# Patient Record
Sex: MALE | Race: WHITE | NOT HISPANIC OR LATINO | Employment: PART TIME | URBAN - METROPOLITAN AREA
[De-identification: names, ages, dates, MRNs, and addresses within clinical notes are randomized per-mention and may not be internally consistent; named-entity substitution may affect disease eponyms.]

---

## 2017-08-02 ENCOUNTER — HOSPITAL ENCOUNTER (EMERGENCY)
Facility: CLINIC | Age: 22
Discharge: HOME | End: 2017-08-02
Attending: EMERGENCY MEDICINE | Admitting: EMERGENCY MEDICINE
Payer: COMMERCIAL

## 2017-08-02 VITALS
TEMPERATURE: 98 F | DIASTOLIC BLOOD PRESSURE: 80 MMHG | HEIGHT: 73 IN | HEART RATE: 61 BPM | WEIGHT: 188 LBS | OXYGEN SATURATION: 98 % | BODY MASS INDEX: 24.92 KG/M2 | SYSTOLIC BLOOD PRESSURE: 140 MMHG | RESPIRATION RATE: 18 BRPM

## 2017-08-02 DIAGNOSIS — S81.811A LACERATION OF LEG, RIGHT, INITIAL ENCOUNTER: ICD-10-CM

## 2017-08-02 PROCEDURE — 99283 EMERGENCY DEPT VISIT LOW MDM: CPT

## 2017-08-02 PROCEDURE — 12002 RPR S/N/AX/GEN/TRNK2.6-7.5CM: CPT

## 2017-08-02 NOTE — ED AVS SNAPSHOT
Emergency Department    [address] [place] [address]    Phone:  [phone]    Fax:  [phone]                                       Kannan Ren   MRN: [id number]    Department:   Emergency Department   Date of Visit:  8/2/2017           After Visit Summary Signature Page     I have received my discharge instructions, and my questions have been answered. I have discussed any challenges I see with this plan with the nurse or doctor.    ..........................................................................................................................................  Patient/Patient Representative Signature      ..........................................................................................................................................  Patient Representative Print Name and Relationship to Patient    ..................................................               ................................................  Date                                            Time    ..........................................................................................................................................  Reviewed by Signature/Title    ...................................................              ..............................................  Date                                                            Time

## 2017-08-02 NOTE — ED AVS SNAPSHOT
Emergency Department    [address] [place] [address]    Phone:  [phone]    Fax:  [phone]                                       Kannan Ren   MRN: [id number]    Department:   Emergency Department   Date of Visit:  8/2/2017           Patient Information     Date Of Birth          1995        Your diagnoses for this visit were:     Laceration of leg, right, initial encounter        You were seen by Caro Reddy MD.      Follow-up Information     Follow up with None.    Why:  Sutures out in 10 days. Antibiotic ointment and bandage        Discharge Instructions          * LACERATION (All Closures)  A laceration is a cut through the skin. This will usually require stitches (sutures) or staples if it is deep. Minor cuts may be treated with a tape closure ( Steri-Strips ) or Dermabond skin glue.       HOME CARE:  PAIN MEDICINE: You may use acetaminophen (Tylenol) 650-1000 mg every 6 hours or ibuprofen (Motrin, Advil) 600 mg every 6-8 hours with food to control pain, if you are able to take these medicines. [NOTE: If you have chronic liver or kidney disease or ever had a stomach ulcer or GI bleeding, talk with your doctor before using these medicines.]  EXTREMITY, FACE or TRUNK WOUNDS:    Keep the wound clean and dry. If a bandage was applied and it becomes wet or dirty, replace it. Otherwise, leave it in place for the first 24 hours.    If stitches or staples were used, clean the wound daily. Protect the wound from sunlight and tanning lamps.    After removing the bandage, wash the area with soap and water. Use a wet cotton swab (Q tip) to loosen and remove any blood or crust that forms.    After cleaning, apply a thin layer of Polysporin or Bacitracin ointment. This will keep the wound clean and make it easier to remove the stitches or staples. Reapply a fresh bandage.    You may remove the bandage to shower as usual after the first 24 hours, but do not soak the area in  water (no swimming) until the stitches or staples are removed.    If Steri-Strips were used, keep the area clean and dry. If it becomes wet, blot it dry with a towel. It is okay to take a brief shower, but avoid scrubbing the area.    If Dermabond skin adhesive was used, do not scratch, rub or pick at the adhesive film. Do not place tape directly over the film. Do not apply liquid, ointment or creams to the wound while the film is in place. Do not clean the wound with peroxide and do not apply ointments. Avoid activities that cause heavy sweating until the film has fallen off. Protect the wound from prolonged exposure to sunlight or tanning lamps. You may shower as usual but do not soak the wound in water (no baths or swimming). The film will fall off by itself in 5-10 days.  SCALP WOUNDS: During the first two days, you may carefully rinse your hair in the shower to remove blood, glass or dirt particles. After two days, you may shower and shampoo your hair normally. Do not soak your scalp in the tub or go swimming until the stitches or staples have been removed.  MOUTH WOUNDS: Eat soft foods to reduce pain. If the cut is inside of your mouth, clean by rinsing after each meal and at bedtime with a mixture of equal parts water and Hydrogen Peroxide (do not swallow!). Or, you can use a cotton swab to directly apply Hydrogen Peroxide onto the cut.  After the wound is done healing, use sunscreen over the area whenever exposed for the next 6 minths to avoid a darker scar.     FOLLOW UP: Most skin wounds heal within ten days. Mouth and facial wounds heal within five days. However, even with proper treatment, a wound infection may sometimes occur. Therefore, you should check the wound daily for signs of infection listed below.  Stitches should be removed from the face within five days; stitches and staples should be removed from other parts of the body within 7-10 days. Unless you are told to come back to the emergency  room, you may have your doctor or urgent care remove the stitches. If dissolving stitches were used in the mouth, these will fall out or dissolve without the need for removal. If tape closures ( Steri-Strips ) were used, remove them yourself if they have not fallen off after 7 days. If Dermabond skin glue was used, the film will fall off by itself in 5-10 days.      GET PROMPT MEDICAL ATTENTION  if any of the following occur:    Increasing pain in the wound    Redness, swelling or pus coming from the wound    Fever over 101 F (38.3 C) oral    If stitches or staples come apart or fall out or if Steri-Strips fall off before seven days    If the wound edges re-open    Bleeding not controlled by direct pressure    8176-9500 [facility], [address] [place] [address]. All rights reserved. This information is not intended as a substitute for professional medical care. Always follow your healthcare professional's instructions.      24 Hour Appointment Hotline       To make an appointment at any [facility], call [phone] ([phone]). If you don't have a family doctor or clinic, we will help you find one. [facility] are conveniently located to serve the needs of you and your family.             Review of your medicines      Notice     You have not been prescribed any medications.            Orders Needing Specimen Collection     None      Pending Results     No orders found from 7/31/2017 to 8/3/2017.            Pending Culture Results     No orders found from 7/31/2017 to 8/3/2017.            Pending Results Instructions     If you had any lab results that were not finalized at the time of your Discharge, you can call the ED Lab Result RN at [phone]. You will be contacted by this team for any positive Lab results or changes in treatment. The nurses are available 7 days a week from 10A to 6:30P.  You can leave a message 24 hours per day and they will return your call.        Test  Results From Your Hospital Stay               Clinical Quality Measure: Blood Pressure Screening     Your blood pressure was checked while you were in the emergency department today. The last reading we obtained was  BP: 132/63 . Please read the guidelines below about what these numbers mean and what you should do about them.  If your systolic blood pressure (the top number) is less than 120 and your diastolic blood pressure (the bottom number) is less than 80, then your blood pressure is normal. There is nothing more that you need to do about it.  If your systolic blood pressure (the top number) is 120-139 or your diastolic blood pressure (the bottom number) is 80-89, your blood pressure may be higher than it should be. You should have your blood pressure rechecked within a year by a primary care provider.  If your systolic blood pressure (the top number) is 140 or greater or your diastolic blood pressure (the bottom number) is 90 or greater, you may have high blood pressure. High blood pressure is treatable, but if left untreated over time it can put you at risk for heart attack, stroke, or kidney failure. You should have your blood pressure rechecked by a primary care provider within the next 4 weeks.  If your provider in the emergency department today gave you specific instructions to follow-up with your doctor or provider even sooner than that, you should follow that instruction and not wait for up to 4 weeks for your follow-up visit.        Thank you for choosing [facility]       Thank you for choosing [facility] for your care. Our goal is always to provide you with excellent care. Hearing back from our patients is one way we can continue to improve our services. Please take a few minutes to complete the written survey that you may receive in the mail after you visit with us. Thank you!        [facility] Information     [facility] lets you send messages to your doctor, view your test results, renew your prescriptions,  "schedule appointments and more. To sign up, go to [email or web address] . Click on \"Log in\" on the left side of the screen, which will take you to the Welcome page. Then click on \"Sign up Now\" on the right side of the page.     You will be asked to enter the access code listed below, as well as some personal information. Please follow the directions to create your username and password.     Your access code is: [email or web address]  Expires: 10/31/2017 11:47 PM     Your access code will  in 90 days. If you need help or a new code, please call your [facility] clinic or [phone].        Care EveryWhere ID     This is your Care EveryWhere ID. This could be used by other organizations to access your [facility] medical records  [id number]        Equal Access to Services     BENY [place] : Scott Chen, karen rubio, alma rosa kim, mervat maki . So [facility] [phone].    ATENCIÓN: Si habla español, tiene a tafoya disposición servicios gratuitos de asistencia lingüística. Llame al [phone].    We comply with applicable federal civil rights laws and [place] laws. We do not discriminate on the basis of race, color, national origin, age, disability sex, sexual orientation or gender identity.            After Visit Summary       This is your record. Keep this with you and show to your community pharmacist(s) and doctor(s) at your next visit.                  "

## 2017-08-03 ASSESSMENT — ENCOUNTER SYMPTOMS: WOUND: 1

## 2017-08-03 NOTE — ED PROVIDER NOTES
"  History     Chief Complaint:  Laceration     HPI   Kannan Ren is a 22 year old otherwise healthy male who presents to the emergency department today for evaluation of a laceration. The patient reports around 2145, an hour prior to arrival, he accidentally fell backwards onto a glass coffee table sustaining a laceration to his posterior right calf. The glass was broken before he fell and he denies any foreign glass in the wound. He placed tape onto the wound immediately afterwards and bleeding is controlled. He denies any other injuries. Of note, tetanus is up to date.     Allergies:  Amoxicillin      Medications:    The patient is currently on no regular medications.    Past Medical History:    History reviewed. No pertinent past surgical history.    Past Surgical History:    Orthopedic surgery    Family History:    History reviewed. No pertinent family history.     Social History:  The patient was accompanied to the ED by his significant other..  Smoking Status: Never  Smokeless Tobacco: Never  Alcohol Use: No  Marital Status:  Single      Review of Systems   Skin: Positive for wound (laceration right upper calf).     Physical Exam   First Vitals:  BP: 132/63  Pulse: 61  Temp: 98  F (36.7  C)  Resp: 18  Height: 185.4 cm (6' 1\")  Weight: 85.3 kg (188 lb)  SpO2: 98 %      Physical Exam  Physical Exam   Constitutional:  Patient is oriented to person, place, and time. They appear well-developed and well-nourished. No obvious distress.    HENT:   Mouth/Throat:   Oropharynx is clear and moist.   Eyes:    Conjunctivae normal and EOM are normal. Pupils are equal, round, and reactive to light.   Neck:    Normal range of motion.   Cardiovascular: Normal rate, regular rhythm and normal heart sounds.  Exam reveals no gallop and no friction rub.  No murmur heard.  Pulmonary/Chest:  Effort normal and breath sounds normal. Patient has no wheezes. Patient has no rales.   Musculoskeletal:  Normal range of motion. Patient " exhibits no edema.   Neurological:   Patient is alert and oriented to person, place, and time. Patient has normal strength. No cranial nerve deficit or sensory deficit. GCS 15  Skin:    3cm posterior proximal right calf laceration, superficial to subcutaneous fat. No muscle involvement. No glass noted.  Psychiatric:   Patient has a normal mood and affect. Patient's behavior is normal. Judgment and thought content normal.     Emergency Department Course     Procedures:     Laceration Repair      LACERATION:  A simple clean 3 cm laceration.    LOCATION:  Proximal right calf.    FUNCTION:  Distally sensation, circulation, motor and tendon function are intact.    ANESTHESIA:  Local using 1% Xylocaine total of 3 mLs.    PREPARATION:  Irrigation with Normal Saline and Shur Clens.    DEBRIDEMENT:  no debridement.    CLOSURE:  Wound was closed with One Layer.  Skin closed with 6 x 4.0 Ethylon using interrupted sutures..    Emergency Department Course:  Nursing notes and vitals reviewed.  2300: I performed an exam of the patient as documented above.   2345: Above laceration performed.   I discussed the treatment plan with the patient. They expressed understanding of this plan and consented to discharge. They will be discharged home with instructions for care and follow up. In addition, the patient will return to the emergency department if their symptoms persist, worsen, if new symptoms arise or if there is any concern.  All questions were answered.    Impression & Plan      Medical Decision Making:  The patient presented with a laceration.  The wound was carefully evaluated and explored.  No foreign bodies were noted on exploration or irrigation.  The laceration was closed with sutures as noted above.  There is no evidence of muscular, tendon, or bony damage with this laceration.  There is also no evidence of vascular or neurologic compromise.  Possible complications (infection, scarring) were reviewed with the patient.   Follow up with primary care will be indicated for suture removal as noted in the discharge section.    Diagnosis:    ICD-10-CM    1. Laceration of leg, right, initial encounter S81.811A      Disposition:  Discharged to home    Scribe Disclosure:  I, Teresa Marquez, am serving as a scribe at 10:40 PM on 8/2/2017 to document services personally performed by Caro Reddy MD based on my observations and the provider's statements to me.     8/2/2017    EMERGENCY DEPARTMENT       Caro Reddy MD  08/03/17 0011

## 2017-08-03 NOTE — DISCHARGE INSTRUCTIONS
* LACERATION (All Closures)  A laceration is a cut through the skin. This will usually require stitches (sutures) or staples if it is deep. Minor cuts may be treated with a tape closure ( Steri-Strips ) or Dermabond skin glue.       HOME CARE:  PAIN MEDICINE: You may use acetaminophen (Tylenol) 650-1000 mg every 6 hours or ibuprofen (Motrin, Advil) 600 mg every 6-8 hours with food to control pain, if you are able to take these medicines. [NOTE: If you have chronic liver or kidney disease or ever had a stomach ulcer or GI bleeding, talk with your doctor before using these medicines.]  EXTREMITY, FACE or TRUNK WOUNDS:    Keep the wound clean and dry. If a bandage was applied and it becomes wet or dirty, replace it. Otherwise, leave it in place for the first 24 hours.    If stitches or staples were used, clean the wound daily. Protect the wound from sunlight and tanning lamps.    After removing the bandage, wash the area with soap and water. Use a wet cotton swab (Q tip) to loosen and remove any blood or crust that forms.    After cleaning, apply a thin layer of Polysporin or Bacitracin ointment. This will keep the wound clean and make it easier to remove the stitches or staples. Reapply a fresh bandage.    You may remove the bandage to shower as usual after the first 24 hours, but do not soak the area in water (no swimming) until the stitches or staples are removed.    If Steri-Strips were used, keep the area clean and dry. If it becomes wet, blot it dry with a towel. It is okay to take a brief shower, but avoid scrubbing the area.    If Dermabond skin adhesive was used, do not scratch, rub or pick at the adhesive film. Do not place tape directly over the film. Do not apply liquid, ointment or creams to the wound while the film is in place. Do not clean the wound with peroxide and do not apply ointments. Avoid activities that cause heavy sweating until the film has fallen off. Protect the wound from prolonged  exposure to sunlight or tanning lamps. You may shower as usual but do not soak the wound in water (no baths or swimming). The film will fall off by itself in 5-10 days.  SCALP WOUNDS: During the first two days, you may carefully rinse your hair in the shower to remove blood, glass or dirt particles. After two days, you may shower and shampoo your hair normally. Do not soak your scalp in the tub or go swimming until the stitches or staples have been removed.  MOUTH WOUNDS: Eat soft foods to reduce pain. If the cut is inside of your mouth, clean by rinsing after each meal and at bedtime with a mixture of equal parts water and Hydrogen Peroxide (do not swallow!). Or, you can use a cotton swab to directly apply Hydrogen Peroxide onto the cut.  After the wound is done healing, use sunscreen over the area whenever exposed for the next 6 minths to avoid a darker scar.     FOLLOW UP: Most skin wounds heal within ten days. Mouth and facial wounds heal within five days. However, even with proper treatment, a wound infection may sometimes occur. Therefore, you should check the wound daily for signs of infection listed below.  Stitches should be removed from the face within five days; stitches and staples should be removed from other parts of the body within 7-10 days. Unless you are told to come back to the emergency room, you may have your doctor or urgent care remove the stitches. If dissolving stitches were used in the mouth, these will fall out or dissolve without the need for removal. If tape closures ( Steri-Strips ) were used, remove them yourself if they have not fallen off after 7 days. If Dermabond skin glue was used, the film will fall off by itself in 5-10 days.      GET PROMPT MEDICAL ATTENTION  if any of the following occur:    Increasing pain in the wound    Redness, swelling or pus coming from the wound    Fever over 101 F (38.3 C) oral    If stitches or staples come apart or fall out or if Steri-Strips fall  off before seven days    If the wound edges re-open    Bleeding not controlled by direct pressure    3231-2355 [facility], [address] [place] [address], [place] [address]. All rights reserved. This information is not intended as a substitute for professional medical care. Always follow your healthcare professional's instructions.

## 2018-03-26 ENCOUNTER — OFFICE VISIT (OUTPATIENT)
Dept: URGENT CARE | Facility: URGENT CARE | Age: 23
End: 2018-03-26

## 2018-03-26 ENCOUNTER — NURSE TRIAGE (OUTPATIENT)
Dept: NURSING | Facility: CLINIC | Age: 23
End: 2018-03-26

## 2018-03-26 VITALS
OXYGEN SATURATION: 99 % | HEART RATE: 121 BPM | DIASTOLIC BLOOD PRESSURE: 73 MMHG | SYSTOLIC BLOOD PRESSURE: 125 MMHG | TEMPERATURE: 103.4 F | WEIGHT: 180 LBS | BODY MASS INDEX: 23.75 KG/M2

## 2018-03-26 DIAGNOSIS — R07.0 THROAT PAIN: ICD-10-CM

## 2018-03-26 DIAGNOSIS — J10.1 INFLUENZA B: Primary | ICD-10-CM

## 2018-03-26 DIAGNOSIS — R68.89 FLU-LIKE SYMPTOMS: ICD-10-CM

## 2018-03-26 LAB
DEPRECATED S PYO AG THROAT QL EIA: NORMAL
FLUAV+FLUBV AG SPEC QL: NEGATIVE
FLUAV+FLUBV AG SPEC QL: POSITIVE
SPECIMEN SOURCE: ABNORMAL
SPECIMEN SOURCE: NORMAL

## 2018-03-26 PROCEDURE — 87880 STREP A ASSAY W/OPTIC: CPT | Performed by: FAMILY MEDICINE

## 2018-03-26 PROCEDURE — 87804 INFLUENZA ASSAY W/OPTIC: CPT | Performed by: FAMILY MEDICINE

## 2018-03-26 PROCEDURE — 99203 OFFICE O/P NEW LOW 30 MIN: CPT | Performed by: FAMILY MEDICINE

## 2018-03-26 PROCEDURE — 87081 CULTURE SCREEN ONLY: CPT | Performed by: FAMILY MEDICINE

## 2018-03-26 RX ORDER — OSELTAMIVIR PHOSPHATE 75 MG/1
75 CAPSULE ORAL 2 TIMES DAILY
Qty: 10 CAPSULE | Refills: 0 | Status: SHIPPED | OUTPATIENT
Start: 2018-03-26

## 2018-03-26 ASSESSMENT — ENCOUNTER SYMPTOMS
TROUBLE SWALLOWING: 1
FATIGUE: 1
FEVER: 1
NAUSEA: 1
CHILLS: 1
COUGH: 1
DIAPHORESIS: 1
SORE THROAT: 1

## 2018-03-26 NOTE — MR AVS SNAPSHOT
After Visit Summary   3/26/2018    Kannan Ren    MRN: [id number]           Patient Information     Date Of Birth          1995        Visit Information        Provider Department      3/26/2018 8:15 PM Jasper Alvarenga MD [facility]        Today's Diagnoses     Influenza B    -  1    Throat pain          Care Instructions      Influenza (Adult)    Influenza is also called the flu. It is a viral illness that affects the air passages of your lungs. It is different from the common cold. The flu can easily be passed from one to person to another. It may be spread through the air by coughing and sneezing. Or it can be spread by touching the sick person and then touching your own eyes, nose, or mouth.  The flu starts 1 to 3 days after you are exposed to the flu virus. It may last for 1 to 2 weeks but many people feel tired or fatigued for many weeks afterward. You usually don t need to take antibiotics unless you have a complication. This might be an ear or sinus infection or pneumonia.  Symptoms of the flu may be mild or severe. They can include extreme tiredness (wanting to stay in bed all day), chills, fevers, muscle aches, soreness with eye movement, headache, and a dry, hacking cough.  Home care  Follow these guidelines when caring for yourself at home:    Avoid being around cigarette smoke, whether yours or other people s.    Acetaminophen or ibuprofen will help ease your fever, muscle aches, and headache. Don t give aspirin to anyone younger than 18 who has the flu. Aspirin can harm the liver.    Nausea and loss of appetite are common with the flu. Eat light meals. Drink 6 to 8 glasses of liquids every day. Good choices are water, sport drinks, soft drinks without caffeine, juices, tea, and soup. Extra fluids will also help loosen secretions in your nose and lungs.    Over-the-counter cold medicines will not make the flu go away faster. But the medicines may  help with coughing, sore throat, and congestion in your nose and sinuses. Don t use a decongestant if you have high blood pressure.    Stay home until your fever has been gone for at least 24 hours without using medicine to reduce fever.  Follow-up care  Follow up with your healthcare provider, or as advised, if you are not getting better over the next week.  If you are age 65 or older, talk with your provider about getting a pneumococcal vaccine every 5 years. You should also get this vaccine if you have chronic asthma or COPD. All adults should get a flu vaccine every fall. Ask your provider about this.  When to seek medical advice  Call your healthcare provider right away if any of these occur:    Cough with lots of colored mucus (sputum) or blood in your mucus    Chest pain, shortness of breath, wheezing, or trouble breathing    Severe headache, or face, neck, or ear pain    New rash with fever    Fever of 100.4 F (38 C) or higher, or as directed by your healthcare provider    Confusion, behavior change, or seizure    Severe weakness or dizziness    You get a new fever or cough after getting better for a few days  Date Last Reviewed: 1/1/2017 2000-2017 [employer]. [address] [place] [address]. All rights reserved. This information is not intended as a substitute for professional medical care. Always follow your healthcare professional's instructions.                Follow-ups after your visit        Follow-up notes from your care team     Return if symptoms worsen or fail to improve.      Who to contact     If you have questions or need follow up information about today's clinic visit or your schedule please contact [facility] directly at [phone].  Normal or non-critical lab and imaging results will be communicated to you by MyChart, letter or phone within 4 business days after the clinic has received the results. If you do not hear from us within 7  "days, please contact the clinic through revoPT or phone. If you have a critical or abnormal lab result, we will notify you by phone as soon as possible.  Submit refill requests through revoPT or call your pharmacy and they will forward the refill request to us. Please allow 3 business days for your refill to be completed.          Additional Information About Your Visit        InCommharTicket [employer] Information     revoPT lets you send messages to your doctor, view your test results, renew your prescriptions, schedule appointments and more. To sign up, go to [email or web address] . Click on \"Log in\" on the left side of the screen, which will take you to the Welcome page. Then click on \"Sign up Now\" on the right side of the page.     You will be asked to enter the access code listed below, as well as some personal information. Please follow the directions to create your username and password.     Your access code is: [email or web address]  Expires: 2018  8:58 PM     Your access code will  in 90 days. If you need help or a new code, please call your [facility] clinic or [phone].        Care EveryWhere ID     This is your Care EveryWhere ID. This could be used by other organizations to access your [facility] medical records  [id number]        Your Vitals Were     Pulse Temperature Pulse Oximetry BMI (Body Mass Index)          121 103.4  F (39.7  C) (Oral) 99% 23.75 kg/m2         Blood Pressure from Last 3 Encounters:   18 125/73   17 140/80   16 138/57    Weight from Last 3 Encounters:   18 180 lb (81.6 kg)   17 188 lb (85.3 kg)   16 194 lb (88 kg)              We Performed the Following     Strep, Rapid Screen          Today's Medication Changes          These changes are accurate as of 3/26/18  8:59 PM.  If you have any questions, ask your nurse or doctor.               Start taking these medicines.        Dose/Directions    oseltamivir 75 MG capsule   Commonly known as:  TAMIFLU   Used " for:  Influenza B   Started by:  Jasper Alvarenga MD        Dose:  75 mg   Take 1 capsule (75 mg) by mouth 2 times daily   Quantity:  10 capsule   Refills:  0            Where to get your medicines      [facility] to [facility]  [place], [place] - [address] AT [address] [place] [address] & [facility]  [address] [place] [address]    Hours:  24-hours Phone:  [phone]     oseltamivir 75 MG capsule                Primary Care Provider Fax #    Provider Not In System [phone]                Equal Access to Services     [facility]: Hadii aad ku hadasho Soomaali, waaxda luqadaha, qaybta kaalmada ademaxyaandrey, mervat maki . So [facility] [phone].    ATENCIÓN: Si habla español, tiene a tafoya disposición servicios gratuitos de asistencia lingüística. [facility] [phone].    We comply with applicable federal civil rights laws and [place] laws. We do not discriminate on the basis of race, color, national origin, age, disability, sex, sexual orientation, or gender identity.            Thank you!     Thank you for choosing [facility]  for your care. Our goal is always to provide you with excellent care. Hearing back from our patients is one way we can continue to improve our services. Please take a few minutes to complete the written survey that you may receive in the mail after your visit with us. Thank you!             Your Updated Medication List - Protect others around you: Learn how to safely use, store and throw away your medicines at www.disposemymeds.org.          This list is accurate as of 3/26/18  8:59 PM.  Always use your most recent med list.                   Brand Name Dispense Instructions for use Diagnosis    IBUPROFEN PO           oseltamivir 75 MG capsule    TAMIFLU    10 capsule    Take 1 capsule (75 mg) by mouth 2 times daily    Influenza B

## 2018-03-27 LAB
BACTERIA SPEC CULT: NORMAL
SPECIMEN SOURCE: NORMAL

## 2018-03-27 NOTE — PATIENT INSTRUCTIONS
Influenza (Adult)    Influenza is also called the flu. It is a viral illness that affects the air passages of your lungs. It is different from the common cold. The flu can easily be passed from one to person to another. It may be spread through the air by coughing and sneezing. Or it can be spread by touching the sick person and then touching your own eyes, nose, or mouth.  The flu starts 1 to 3 days after you are exposed to the flu virus. It may last for 1 to 2 weeks but many people feel tired or fatigued for many weeks afterward. You usually don t need to take antibiotics unless you have a complication. This might be an ear or sinus infection or pneumonia.  Symptoms of the flu may be mild or severe. They can include extreme tiredness (wanting to stay in bed all day), chills, fevers, muscle aches, soreness with eye movement, headache, and a dry, hacking cough.  Home care  Follow these guidelines when caring for yourself at home:    Avoid being around cigarette smoke, whether yours or other people s.    Acetaminophen or ibuprofen will help ease your fever, muscle aches, and headache. Don t give aspirin to anyone younger than 18 who has the flu. Aspirin can harm the liver.    Nausea and loss of appetite are common with the flu. Eat light meals. Drink 6 to 8 glasses of liquids every day. Good choices are water, sport drinks, soft drinks without caffeine, juices, tea, and soup. Extra fluids will also help loosen secretions in your nose and lungs.    Over-the-counter cold medicines will not make the flu go away faster. But the medicines may help with coughing, sore throat, and congestion in your nose and sinuses. Don t use a decongestant if you have high blood pressure.    Stay home until your fever has been gone for at least 24 hours without using medicine to reduce fever.  Follow-up care  Follow up with your healthcare provider, or as advised, if you are not getting better over the next week.  If you are age 65 or  older, talk with your provider about getting a pneumococcal vaccine every 5 years. You should also get this vaccine if you have chronic asthma or COPD. All adults should get a flu vaccine every fall. Ask your provider about this.  When to seek medical advice  Call your healthcare provider right away if any of these occur:    Cough with lots of colored mucus (sputum) or blood in your mucus    Chest pain, shortness of breath, wheezing, or trouble breathing    Severe headache, or face, neck, or ear pain    New rash with fever    Fever of 100.4 F (38 C) or higher, or as directed by your healthcare provider    Confusion, behavior change, or seizure    Severe weakness or dizziness    You get a new fever or cough after getting better for a few days  Date Last Reviewed: 1/1/2017 2000-2017 The [employer]. [address] [place] [address], [place] [address]. All rights reserved. This information is not intended as a substitute for professional medical care. Always follow your healthcare professional's instructions.

## 2018-03-27 NOTE — PROGRESS NOTES
SUBJECTIVE:   Kannan Ren is a 23 year old male presenting with a chief complaint of   Chief Complaint   Patient presents with     Urgent Care     Fever     fever,brown mucous,sore throat,headache,body aches        He is a new patient of [facility].    Onset of symptoms was 4 day(s) ago.  Course of illness is worsening.    Severity moderate  Current and Associated symptoms: fever, chills, sweats, cough - non-productive, sore throat, headache, body aches and dizzy  Treatment measures tried include Tylenol/Ibuprofen.  Predisposing factors include None.        Review of Systems   Constitutional: Positive for chills, diaphoresis, fatigue and fever.   HENT: Positive for sore throat and trouble swallowing. Negative for congestion and ear pain.    Respiratory: Positive for cough.    Gastrointestinal: Positive for nausea.       No past medical history on file.  No family history on file.  Current Outpatient Prescriptions   Medication Sig Dispense Refill     IBUPROFEN PO        oseltamivir (TAMIFLU) 75 MG capsule Take 1 capsule (75 mg) by mouth 2 times daily 10 capsule 0     Social History   Substance Use Topics     Smoking status: Never Smoker     Smokeless tobacco: Never Used     Alcohol use No       OBJECTIVE  /73  Pulse 121  Temp 103.4  F (39.7  C) (Oral)  Wt 180 lb (81.6 kg)  SpO2 99%  BMI 23.75 kg/m2    Physical Exam   Constitutional: He appears well-developed and well-nourished. No distress.   HENT:   Head: Normocephalic and atraumatic.   Right Ear: Tympanic membrane and external ear normal.   Left Ear: Tympanic membrane and external ear normal.   Mouth/Throat: Oropharyngeal exudate present.   Eyes: EOM are normal. Pupils are equal, round, and reactive to light.   Neck: Normal range of motion. Neck supple.   Pulmonary/Chest: Effort normal and breath sounds normal. No respiratory distress.   Lymphadenopathy:     He has cervical adenopathy.   Neurological: He is alert. No cranial nerve deficit.   Skin:  Skin is warm and dry. He is not diaphoretic.   Psychiatric: He has a normal mood and affect.   Nursing note and vitals reviewed.      Labs:  No results found for this or any previous visit (from the past 24 hour(s)).    X-Ray was not done.    ASSESSMENT:      ICD-10-CM    1. Influenza B J10.1 oseltamivir (TAMIFLU) 75 MG capsule   2. Throat pain R07.0 Strep, Rapid Screen        Medical Decision Making:    Differential Diagnosis:  URI Adult/Peds:  Influenza, Strep pharyngitis, Tonsilitis, Viral pharyngitis, Viral syndrome and Viral upper respiratory illness    Serious Comorbid Conditions:  Adult:  None    PLAN:    URI Adult:  Tylenol, Ibuprofen, Fluids, Rest and RX influenza  Tamiflu 75 mg bid x 5 days    Followup:    If not improving or if condition worsens, follow up with your Primary Care Provider    Patient Instructions     Influenza (Adult)    Influenza is also called the flu. It is a viral illness that affects the air passages of your lungs. It is different from the common cold. The flu can easily be passed from one to person to another. It may be spread through the air by coughing and sneezing. Or it can be spread by touching the sick person and then touching your own eyes, nose, or mouth.  The flu starts 1 to 3 days after you are exposed to the flu virus. It may last for 1 to 2 weeks but many people feel tired or fatigued for many weeks afterward. You usually don t need to take antibiotics unless you have a complication. This might be an ear or sinus infection or pneumonia.  Symptoms of the flu may be mild or severe. They can include extreme tiredness (wanting to stay in bed all day), chills, fevers, muscle aches, soreness with eye movement, headache, and a dry, hacking cough.  Home care  Follow these guidelines when caring for yourself at home:    Avoid being around cigarette smoke, whether yours or other people s.    Acetaminophen or ibuprofen will help ease your fever, muscle aches, and headache. Don t give  aspirin to anyone younger than 18 who has the flu. Aspirin can harm the liver.    Nausea and loss of appetite are common with the flu. Eat light meals. Drink 6 to 8 glasses of liquids every day. Good choices are water, sport drinks, soft drinks without caffeine, juices, tea, and soup. Extra fluids will also help loosen secretions in your nose and lungs.    Over-the-counter cold medicines will not make the flu go away faster. But the medicines may help with coughing, sore throat, and congestion in your nose and sinuses. Don t use a decongestant if you have high blood pressure.    Stay home until your fever has been gone for at least 24 hours without using medicine to reduce fever.  Follow-up care  Follow up with your healthcare provider, or as advised, if you are not getting better over the next week.  If you are age 65 or older, talk with your provider about getting a pneumococcal vaccine every 5 years. You should also get this vaccine if you have chronic asthma or COPD. All adults should get a flu vaccine every fall. Ask your provider about this.  When to seek medical advice  Call your healthcare provider right away if any of these occur:    Cough with lots of colored mucus (sputum) or blood in your mucus    Chest pain, shortness of breath, wheezing, or trouble breathing    Severe headache, or face, neck, or ear pain    New rash with fever    Fever of 100.4 F (38 C) or higher, or as directed by your healthcare provider    Confusion, behavior change, or seizure    Severe weakness or dizziness    You get a new fever or cough after getting better for a few days  Date Last Reviewed: 1/1/2017 2000-2017 [employer]. [address] [place] [address], [place] [address]. All rights reserved. This information is not intended as a substitute for professional medical care. Always follow your healthcare professional's instructions.

## 2018-03-27 NOTE — TELEPHONE ENCOUNTER
Sister is caller. She is with the patient and he is aware that she is placing the call. Reports patient has had a fever since 3-23-18. Temp is now 103.6 oral. He c/o sore throat pain, fatigue, nausea, headache and body aches. Denies dyspnea. Is able to walk to the bathroom without assistance. Caller declined being transferred to the scheduling line in order to schedule a clinic appointment.     Protocol and care advice reviewed.   Caller states understanding of the recommended disposition.  Advised to call back if further questions or concerns.     Lin Bean RN/FNA    Reason for Disposition    Fever present > 3 days (72 hours)    Additional Information    Negative: Severe difficulty breathing (e.g., struggling for each breath, speaks in single words)    Negative: Bluish lips, tongue, or face now    Negative: Shock suspected (e.g., cold/pale/clammy skin, too weak to stand, low BP, rapid pulse)    Negative: Sounds like a life-threatening emergency to the triager    Negative: Severe sore throat    Negative: [1] Doesn't match the criteria for Influenza AND [2] sounds like a cold    Negative: Influenza vaccine reaction is suspected    Negative: Chest pain  (Exception: MILD central chest pain, present only when coughing)    Negative: [1] Headache AND [2] stiff neck (can't touch chin to chest)    Negative: Fever > 104 F (40 C)    Negative: [1] Difficulty breathing AND [2] not severe AND [3] not from stuffy nose (e.g., not relieved by cleaning out the nose)    Negative: Patient sounds very sick or weak to the triager    Negative: [1] Fever > 101 F (38.3 C) AND [2] age > 60    Negative: [1] Fever > 101 F (38.3 C) AND [2] bedridden (e.g., nursing home patient, CVA, chronic illness, recovering from surgery)    Negative: [1] Fever > 100.5 F (38.1 C) AND [2] diabetes mellitus or weak immune system (e.g., HIV positive, cancer chemo, splenectomy, organ transplant, chronic steroids)    Negative: Patient is HIGH RISK (e.g., age  > 64 years, pregnant, HIV+, or chronic medical condition)    Protocols used: INFLUENZA - SEASONAL-ADULT-AH

## 2018-03-27 NOTE — NURSING NOTE
"Chief Complaint   Patient presents with     Urgent Care     Fever     fever,brown mucous,sore throat,headache,body aches        Initial /73  Pulse 121  Temp 103.4  F (39.7  C) (Oral)  Wt 180 lb (81.6 kg)  SpO2 99%  BMI 23.75 kg/m2 Estimated body mass index is 23.75 kg/(m^2) as calculated from the following:    Height as of 8/2/17: 6' 1\" (1.854 m).    Weight as of this encounter: 180 lb (81.6 kg).  Medication Reconciliation: complete    Dot MÁRQUEZ MA     "

## 2018-03-29 ENCOUNTER — TELEPHONE (OUTPATIENT)
Dept: FAMILY MEDICINE | Facility: CLINIC | Age: 23
End: 2018-03-29

## 2018-03-29 NOTE — LETTER
[facility]  [address] [facility] [address]  [phone]      March 29, 2018    RE:  Kannan Ren                                                                                                                                                       [address] [facility]   [place] [address]            To whom it may concern:    Kannan Ren was seen in the [facility] on 3/26/18 for a medical issue. Patient may return to work after he has been fever free X 24 hours without the use of fever reducing medications.      Sincerely,        Sabrina Franks RN/Dr.Boris Alvarenga    [facility]

## 2018-03-29 NOTE — TELEPHONE ENCOUNTER
Contacted Bryanna [facility] clinic administrator to discuss. Bryanna states that our Urgent care providers can review and approve a note for this patient. Discussed with Dr.Mark Arguelles.  approved a note to be written for this patient. Contacted the patient. Pt requesting a note for work and a note for school. Letters will be left at the [facility] reception  for pt to . Pt states that he will pick them up on Friday 3/301/8.

## 2018-03-29 NOTE — TELEPHONE ENCOUNTER
Reason for Call:  Other Note for missing work and school    Detailed comments: Pt would like notes for missing work and school because of influenza. Pt was seen at urgent care in [place] on 3/26.    Pt would like to be contacted to discuss the best way to get the note.    Phone Number Patient can be reached at: Home number on file [phone] (home)    Best Time: Anytime    Can we leave a detailed message on this number? YES    Call taken on 3/29/2018 at 10:45 AM by Arleen Jonas

## 2018-03-29 NOTE — LETTER
[facility]  [address] [facility] [address]  [phone]      March 29, 2018    RE:  Kannan Ren                                                                                                                                                       [address] [facility]   [place] [address]            To whom it may concern:    Kannan Ren was seen in the [facility] on 3/26/18 for a medical issue. Patient may return to school when he has been fever free  X 24 hours without the use of fever reducing medications.      Sincerely,        Sabrina Franks RN/Dr.Boris Alvarenga    [facility]

## 2020-05-08 ENCOUNTER — HOSPITAL ENCOUNTER (EMERGENCY)
Facility: CLINIC | Age: 25
Discharge: HOME | End: 2020-05-08
Attending: EMERGENCY MEDICINE | Admitting: EMERGENCY MEDICINE
Payer: MEDICAID

## 2020-05-08 ENCOUNTER — APPOINTMENT (OUTPATIENT)
Dept: GENERAL RADIOLOGY | Facility: CLINIC | Age: 25
End: 2020-05-08
Attending: EMERGENCY MEDICINE
Payer: MEDICAID

## 2020-05-08 VITALS
OXYGEN SATURATION: 99 % | TEMPERATURE: 98.3 F | SYSTOLIC BLOOD PRESSURE: 125 MMHG | DIASTOLIC BLOOD PRESSURE: 81 MMHG | RESPIRATION RATE: 18 BRPM

## 2020-05-08 DIAGNOSIS — S93.402A SPRAIN OF LEFT ANKLE, UNSPECIFIED LIGAMENT, INITIAL ENCOUNTER: ICD-10-CM

## 2020-05-08 PROCEDURE — 73610 X-RAY EXAM OF ANKLE: CPT | Mod: LT

## 2020-05-08 PROCEDURE — 99283 EMERGENCY DEPT VISIT LOW MDM: CPT

## 2020-05-08 ASSESSMENT — ENCOUNTER SYMPTOMS: ARTHRALGIAS: 1

## 2020-05-08 NOTE — ED AVS SNAPSHOT
[facility]  [address]  [facility] [address]  Phone:  [phone]  Fax:  [phone]                                    Kannan Ren   MRN: [id number]    Department:  [facility]   Date of Visit:  5/8/2020           After Visit Summary Signature Page    I have received my discharge instructions, and my questions have been answered. I have discussed any challenges I see with this plan with the nurse or doctor.    ..........................................................................................................................................  Patient/Patient Representative Signature      ..........................................................................................................................................  Patient Representative Print Name and Relationship to Patient    ..................................................               ................................................  Date                                   Time    ..........................................................................................................................................  Reviewed by Signature/Title    ...................................................              ..............................................  Date                                               Time          22EPIC Rev 08/18

## 2020-05-09 NOTE — ED TRIAGE NOTES
Pt in with L ankle pain following injury playing basketball. No deformity noted. Pt A&Ox4 ABCD's intact

## 2020-05-09 NOTE — ED NOTES
aircast in place, refused crutches. Patient alert and oriented. Respirations even and unlabored. All discharge education given. All questions answered. All medications explained in detail. Patient denies further needs and states that they are ready to leave. Patient wheeled out of the ER

## 2020-05-09 NOTE — DISCHARGE INSTRUCTIONS
Please ice and elevate.  Use Aircast for support.  If ongoing concerns of inability to bear weight or unstable feeling in the ankle consider follow-up with sports medicine orthopedics for ligament injury evaluation.

## 2020-05-09 NOTE — ED PROVIDER NOTES
History     Chief Complaint:  Ankle Pain     HPI   Kannan Ren is a 25 year old male who presents to the emergency department for evaluation of ankle pain. The patient reports he was playing basketball around 2030 today when he inverted his left ankle. He states he has since experienced left ankle pain, worse with weightbearing, prompting his arrival to the ED. He denies any noted deformity.    Allergies:  Amoxicillin     Medications:    The patient is currently on no regular medications.    Past Medical History:    The patient denies any significant past medical history.    Past Surgical History:    Orthopedic surgery     Family History:    No past pertinent family history.    Social History:  Presents alone.  Never smoker.  Negative for alcohol use.  Marital Status:  Single [1]     Review of Systems   Musculoskeletal: Positive for arthralgias and gait problem.   All other systems reviewed and are negative.        Physical Exam     Patient Vitals for the past 24 hrs:   BP Temp Heart Rate Resp SpO2   05/08/20 2127 132/83 -- -- 16 --   05/08/20 2122 -- 96.6  F (35.9  C) 120 -- 96 %     Physical Exam  HENT:      Head: Normocephalic.   Cardiovascular:      Rate and Rhythm: Normal rate.   Pulmonary:      Effort: Pulmonary effort is normal.   Musculoskeletal:      Comments: Left knee nontender left ankle there is mild tenderness and swelling over the lateral malleolus no step-off or deformity.  Fifth metatarsal is nontender.  No joint effusion.  Achilles tendon is intact.   Neurological:      Mental Status: He is alert.         Emergency Department Course     Imaging:  Radiology findings were communicated with the patient who voiced understanding of the findings.    XR Ankle Left G/E 3 Views:  Normal joint spaces and alignment. No fracture.  As per radiology.    Emergency Department Course:  Past medical records, nursing notes, and vitals reviewed.    2140 I performed an exam of the patient as documented above.      The patient was sent for a XR Ankle while in the emergency department, results above.     2215 I rechecked the patient and discussed the results of his workup thus far.     Findings and plan explained to the Patient. Patient discharged home with instructions regarding supportive care, medications, and reasons to return. The importance of close follow-up was reviewed.     Impression & Plan     Medical Decision Making:  Kannan Ren is a 25 year old male who presents with inversion injury to the left ankle x-rays are negative for fracture patient offered reassurance Aircast and follow-up as needed.    Diagnosis:    ICD-10-CM   1. Sprain of left ankle, unspecified ligament, initial encounter  S93.402A       Disposition:  Discharged to home.    Scribe Disclosure:  I, Pascual Woodson, am serving as a scribe at 9:38 PM on 5/8/2020 to document services personally performed by Bishop Alvarez MD based on my observations and the provider's statements to me.     [facility] EMERGENCY DEPARTMENT     Bishop Alvarez MD  05/12/20 0422

## 2022-02-20 ENCOUNTER — HEALTH MAINTENANCE LETTER (OUTPATIENT)
Age: 27
End: 2022-02-20

## 2022-10-23 ENCOUNTER — HEALTH MAINTENANCE LETTER (OUTPATIENT)
Age: 27
End: 2022-10-23

## 2023-01-20 ENCOUNTER — APPOINTMENT (OUTPATIENT)
Dept: CT IMAGING | Facility: CLINIC | Age: 28
End: 2023-01-20
Attending: EMERGENCY MEDICINE
Payer: COMMERCIAL

## 2023-01-20 ENCOUNTER — HOSPITAL ENCOUNTER (EMERGENCY)
Facility: CLINIC | Age: 28
Discharge: HOME | End: 2023-01-21
Attending: EMERGENCY MEDICINE | Admitting: EMERGENCY MEDICINE
Payer: COMMERCIAL

## 2023-01-20 DIAGNOSIS — R11.2 NAUSEA, VOMITING, AND DIARRHEA: ICD-10-CM

## 2023-01-20 DIAGNOSIS — R19.7 NAUSEA, VOMITING, AND DIARRHEA: ICD-10-CM

## 2023-01-20 LAB
ANION GAP SERPL CALCULATED.3IONS-SCNC: 15 MMOL/L (ref 7–15)
BASOPHILS # BLD AUTO: 0 10E3/UL (ref 0–0.2)
BASOPHILS NFR BLD AUTO: 0 %
BUN SERPL-MCNC: 17.7 MG/DL (ref 6–20)
CALCIUM SERPL-MCNC: 9.7 MG/DL (ref 8.6–10)
CHLORIDE SERPL-SCNC: 99 MMOL/L (ref 98–107)
CREAT SERPL-MCNC: 0.97 MG/DL (ref 0.67–1.17)
DEPRECATED HCO3 PLAS-SCNC: 23 MMOL/L (ref 22–29)
EOSINOPHIL # BLD AUTO: 0.1 10E3/UL (ref 0–0.7)
EOSINOPHIL NFR BLD AUTO: 1 %
ERYTHROCYTE [DISTWIDTH] IN BLOOD BY AUTOMATED COUNT: 12.3 % (ref 10–15)
GFR SERPL CREATININE-BSD FRML MDRD: >90 ML/MIN/1.73M2
GLUCOSE SERPL-MCNC: 110 MG/DL (ref 70–99)
HCT VFR BLD AUTO: 50.2 % (ref 40–53)
HGB BLD-MCNC: 17.3 G/DL (ref 13.3–17.7)
HOLD SPECIMEN: NORMAL
IMM GRANULOCYTES # BLD: 0 10E3/UL
IMM GRANULOCYTES NFR BLD: 0 %
LYMPHOCYTES # BLD AUTO: 0.3 10E3/UL (ref 0.8–5.3)
LYMPHOCYTES NFR BLD AUTO: 2 %
MCH RBC QN AUTO: 31.5 PG (ref 26.5–33)
MCHC RBC AUTO-ENTMCNC: 34.5 G/DL (ref 31.5–36.5)
MCV RBC AUTO: 91 FL (ref 78–100)
MONOCYTES # BLD AUTO: 0.8 10E3/UL (ref 0–1.3)
MONOCYTES NFR BLD AUTO: 7 %
NEUTROPHILS # BLD AUTO: 10.6 10E3/UL (ref 1.6–8.3)
NEUTROPHILS NFR BLD AUTO: 90 %
NRBC # BLD AUTO: 0 10E3/UL
NRBC BLD AUTO-RTO: 0 /100
PLATELET # BLD AUTO: 251 10E3/UL (ref 150–450)
POTASSIUM SERPL-SCNC: 4 MMOL/L (ref 3.4–5.3)
RBC # BLD AUTO: 5.5 10E6/UL (ref 4.4–5.9)
SODIUM SERPL-SCNC: 137 MMOL/L (ref 136–145)
WBC # BLD AUTO: 11.8 10E3/UL (ref 4–11)

## 2023-01-20 PROCEDURE — 250N000009 HC RX 250: Performed by: EMERGENCY MEDICINE

## 2023-01-20 PROCEDURE — 250N000011 HC RX IP 250 OP 636: Performed by: EMERGENCY MEDICINE

## 2023-01-20 PROCEDURE — 36415 COLL VENOUS BLD VENIPUNCTURE: CPT | Performed by: EMERGENCY MEDICINE

## 2023-01-20 PROCEDURE — 74177 CT ABD & PELVIS W/CONTRAST: CPT

## 2023-01-20 PROCEDURE — 96376 TX/PRO/DX INJ SAME DRUG ADON: CPT

## 2023-01-20 PROCEDURE — 85025 COMPLETE CBC W/AUTO DIFF WBC: CPT | Performed by: EMERGENCY MEDICINE

## 2023-01-20 PROCEDURE — 96375 TX/PRO/DX INJ NEW DRUG ADDON: CPT

## 2023-01-20 PROCEDURE — 99285 EMERGENCY DEPT VISIT HI MDM: CPT | Mod: 25

## 2023-01-20 PROCEDURE — 80048 BASIC METABOLIC PNL TOTAL CA: CPT | Performed by: EMERGENCY MEDICINE

## 2023-01-20 PROCEDURE — 96374 THER/PROPH/DIAG INJ IV PUSH: CPT | Mod: 59

## 2023-01-20 PROCEDURE — 258N000003 HC RX IP 258 OP 636: Performed by: EMERGENCY MEDICINE

## 2023-01-20 PROCEDURE — 96361 HYDRATE IV INFUSION ADD-ON: CPT

## 2023-01-20 RX ORDER — ONDANSETRON 2 MG/ML
4 INJECTION INTRAMUSCULAR; INTRAVENOUS ONCE
Status: COMPLETED | OUTPATIENT
Start: 2023-01-20 | End: 2023-01-20

## 2023-01-20 RX ORDER — IOPAMIDOL 755 MG/ML
500 INJECTION, SOLUTION INTRAVASCULAR ONCE
Status: COMPLETED | OUTPATIENT
Start: 2023-01-20 | End: 2023-01-20

## 2023-01-20 RX ORDER — KETOROLAC TROMETHAMINE 15 MG/ML
15 INJECTION, SOLUTION INTRAMUSCULAR; INTRAVENOUS ONCE
Status: COMPLETED | OUTPATIENT
Start: 2023-01-20 | End: 2023-01-20

## 2023-01-20 RX ADMIN — KETOROLAC TROMETHAMINE 15 MG: 15 INJECTION, SOLUTION INTRAMUSCULAR; INTRAVENOUS at 23:44

## 2023-01-20 RX ADMIN — SODIUM CHLORIDE 1000 ML: 9 INJECTION, SOLUTION INTRAVENOUS at 23:19

## 2023-01-20 RX ADMIN — ONDANSETRON 4 MG: 2 INJECTION INTRAMUSCULAR; INTRAVENOUS at 23:21

## 2023-01-20 RX ADMIN — SODIUM CHLORIDE 1000 ML: 9 INJECTION, SOLUTION INTRAVENOUS at 19:58

## 2023-01-20 RX ADMIN — ONDANSETRON 4 MG: 2 INJECTION INTRAMUSCULAR; INTRAVENOUS at 19:58

## 2023-01-20 RX ADMIN — IOPAMIDOL 90 ML: 755 INJECTION, SOLUTION INTRAVENOUS at 20:58

## 2023-01-20 RX ADMIN — SODIUM CHLORIDE 65 ML: 9 INJECTION, SOLUTION INTRAVENOUS at 20:58

## 2023-01-20 ASSESSMENT — ACTIVITIES OF DAILY LIVING (ADL)
ADLS_ACUITY_SCORE: 35
ADLS_ACUITY_SCORE: 35

## 2023-01-21 VITALS
TEMPERATURE: 97.8 F | DIASTOLIC BLOOD PRESSURE: 63 MMHG | OXYGEN SATURATION: 96 % | SYSTOLIC BLOOD PRESSURE: 114 MMHG | RESPIRATION RATE: 16 BRPM | HEART RATE: 95 BPM

## 2023-01-21 RX ORDER — ONDANSETRON 4 MG/1
4 TABLET, ORALLY DISINTEGRATING ORAL EVERY 8 HOURS PRN
Qty: 10 TABLET | Refills: 0 | Status: SHIPPED | OUTPATIENT
Start: 2023-01-21 | End: 2023-01-24

## 2023-01-21 NOTE — ED PROVIDER NOTES
History     Chief Complaint:  Abdominal Pain and Nausea, Vomiting, & Diarrhea       HPI   Kannan Ren is a 27 year old male who presents for evaluation of nausea, vomiting, and diarrhea with abdominal pain.  Symptoms began about 8 hours prior to arrival.  His brother experienced symptoms before him and they were in contact with each other.  He has some chills.  No known blood in vomit or stool.  He is otherwise healthy.      Independent Historian:     Review of External Notes:     ROS:  Review of Systems   Constitutional: Negative for chills and fever.   Respiratory: Negative for cough.    Gastrointestinal: Positive for abdominal pain. Negative for blood in stool, diarrhea, nausea and vomiting.   All other systems reviewed and are negative.        Allergies:  Amoxicillin     Medications:    IBUPROFEN PO  oseltamivir (TAMIFLU) 75 MG capsule        Past Medical History:    No past medical history on file.    Past Surgical History:    Past Surgical History:   Procedure Laterality Date     ORTHOPEDIC SURGERY          Family History:    family history is not on file.    Social History:   reports that he has never smoked. He has never used smokeless tobacco. He reports that he does not drink alcohol and does not use drugs.  PCP: No Ref-Primary, Physician     Physical Exam     Patient Vitals for the past 24 hrs:   BP Temp Pulse Resp SpO2   01/20/23 1949 134/76 97.8  F (36.6  C) 106 16 99 %        Physical Exam  Constitutional: Well appearing.  HEENT: Atraumatic.  Moist mucous membranes.  Neck: Soft.  Supple.   Cardiac: Regular rate and rhythm.  No murmur or rub.  Respiratory: Clear to auscultation bilaterally.  No respiratory distress.  No wheezing, rhonchi, or rales.  Abdomen: Soft with mild diffuse tenderness to palpation..  No rebound or guarding.  Nondistended.  Musculoskeletal: No edema.  Normal range of motion.  Neurologic: Alert and oriented.  Normal tone and bulk.   Skin: No rashes.  No edema.  Psych:  Normal affect.  Normal behavior.        Emergency Department Course   ECG:  No results found for this or any previous visit.    Imaging:  CT Abdomen Pelvis w Contrast   Final Result   IMPRESSION:    1.  No acute process.         Report per radiology    Laboratory:  Labs Ordered and Resulted from Time of ED Arrival to Time of ED Departure   BASIC METABOLIC PANEL - Abnormal       Result Value    Sodium 137      Potassium 4.0      Chloride 99      Carbon Dioxide (CO2) 23      Anion Gap 15      Urea Nitrogen 17.7      Creatinine 0.97      Calcium 9.7      Glucose 110 (*)     GFR Estimate >90     CBC WITH PLATELETS AND DIFFERENTIAL - Abnormal    WBC Count 11.8 (*)     RBC Count 5.50      Hemoglobin 17.3      Hematocrit 50.2      MCV 91      MCH 31.5      MCHC 34.5      RDW 12.3      Platelet Count 251      % Neutrophils 90      % Lymphocytes 2      % Monocytes 7      % Eosinophils 1      % Basophils 0      % Immature Granulocytes 0      NRBCs per 100 WBC 0      Absolute Neutrophils 10.6 (*)     Absolute Lymphocytes 0.3 (*)     Absolute Monocytes 0.8      Absolute Eosinophils 0.1      Absolute Basophils 0.0      Absolute Immature Granulocytes 0.0      Absolute NRBCs 0.0          Procedures       Emergency Department Course & Assessments:             Interventions:  Medications   0.9% sodium chloride BOLUS (1,000 mLs Intravenous New Bag 1/20/23 2319)   0.9% sodium chloride BOLUS (0 mLs Intravenous Stopped 1/20/23 2317)   ondansetron (ZOFRAN) injection 4 mg (4 mg Intravenous Given 1/20/23 1958)   iopamidol (ISOVUE-370) solution 500 mL (90 mLs Intravenous Given 1/20/23 2058)   Saline Flush (65 mLs Other Given 1/20/23 2058)   ondansetron (ZOFRAN) injection 4 mg (4 mg Intravenous Given 1/20/23 2321)        Independent Interpretation (X-rays, CTs, rhythm strip):  CT scan without obvious bowel obstruction or free fluid on my read    Consultations/Discussion of Management or Tests:         Social Determinants of Health affecting  care:      Disposition:  The patient was discharged to home.     Impression & Plan      Medical Decision Making:  Kannan Ren  is a 27-year-old man who is afebrile and hemodynamically stable.  Differential diagnosis includes viral GI illness versus appendicitis versus bowel obstruction versus other.  Lab work-up as noted as above is grossly reassuring.  CT scan of the abdomen and pelvis demonstrates no acute abnormalities.  Discussed likely viral GI illness.  He is feeling improved with the above interventions and feels comfortable discharging home.  He will go home with antiemetics and supportive care and bland diet.  Discussed strict return precautions and care follow-up.  His questions were answered and he was in no distress at time of discharge.    Diagnosis:    ICD-10-CM    1. Nausea, vomiting, and diarrhea  R11.2     R19.7            Discharge Medications:  Discharge Medication List as of 1/21/2023 12:16 AM      START taking these medications    Details   ondansetron (ZOFRAN ODT) 4 MG ODT tab Take 1 tablet (4 mg) by mouth every 8 hours as needed for nausea or vomiting, Disp-10 tablet, R-0, E-Prescribe              1/20/2023   Doug Perales MD Salay, Nicholas J, MD  01/27/23 5405

## 2023-01-21 NOTE — ED TRIAGE NOTES
N/V/D and abd pain x 8 hours.  Pt states he is unable to tolerate any PO.  Pt had an ileus last march with similar symptoms.

## 2023-01-27 ASSESSMENT — ENCOUNTER SYMPTOMS
ABDOMINAL PAIN: 1
NAUSEA: 0
COUGH: 0
VOMITING: 0
CHILLS: 0
BLOOD IN STOOL: 0
DIARRHEA: 0
FEVER: 0

## 2023-04-02 ENCOUNTER — HEALTH MAINTENANCE LETTER (OUTPATIENT)
Age: 28
End: 2023-04-02

## 2024-06-02 ENCOUNTER — HEALTH MAINTENANCE LETTER (OUTPATIENT)
Age: 29
End: 2024-06-02

## 2025-06-15 ENCOUNTER — HEALTH MAINTENANCE LETTER (OUTPATIENT)
Age: 30
End: 2025-06-15